# Patient Record
Sex: FEMALE | Race: WHITE | NOT HISPANIC OR LATINO | Employment: OTHER | URBAN - METROPOLITAN AREA
[De-identification: names, ages, dates, MRNs, and addresses within clinical notes are randomized per-mention and may not be internally consistent; named-entity substitution may affect disease eponyms.]

---

## 2020-11-10 ENCOUNTER — OFFICE VISIT (OUTPATIENT)
Dept: FAMILY MEDICINE | Facility: CLINIC | Age: 20
End: 2020-11-10
Payer: MEDICAID

## 2020-11-10 VITALS
WEIGHT: 240 LBS | HEIGHT: 65 IN | SYSTOLIC BLOOD PRESSURE: 120 MMHG | BODY MASS INDEX: 39.99 KG/M2 | OXYGEN SATURATION: 100 % | DIASTOLIC BLOOD PRESSURE: 79 MMHG | TEMPERATURE: 98.8 F | HEART RATE: 82 BPM

## 2020-11-10 DIAGNOSIS — F31.81 BIPOLAR 2 DISORDER, MAJOR DEPRESSIVE EPISODE (H): ICD-10-CM

## 2020-11-10 DIAGNOSIS — F41.1 GENERALIZED ANXIETY DISORDER: Primary | ICD-10-CM

## 2020-11-10 PROCEDURE — 99203 OFFICE O/P NEW LOW 30 MIN: CPT | Performed by: PHYSICIAN ASSISTANT

## 2020-11-10 PROCEDURE — 96127 BRIEF EMOTIONAL/BEHAV ASSMT: CPT | Performed by: PHYSICIAN ASSISTANT

## 2020-11-10 ASSESSMENT — ANXIETY QUESTIONNAIRES
2. NOT BEING ABLE TO STOP OR CONTROL WORRYING: NEARLY EVERY DAY
IF YOU CHECKED OFF ANY PROBLEMS ON THIS QUESTIONNAIRE, HOW DIFFICULT HAVE THESE PROBLEMS MADE IT FOR YOU TO DO YOUR WORK, TAKE CARE OF THINGS AT HOME, OR GET ALONG WITH OTHER PEOPLE: EXTREMELY DIFFICULT
5. BEING SO RESTLESS THAT IT IS HARD TO SIT STILL: NEARLY EVERY DAY
3. WORRYING TOO MUCH ABOUT DIFFERENT THINGS: NEARLY EVERY DAY
GAD7 TOTAL SCORE: 21
7. FEELING AFRAID AS IF SOMETHING AWFUL MIGHT HAPPEN: NEARLY EVERY DAY
6. BECOMING EASILY ANNOYED OR IRRITABLE: NEARLY EVERY DAY
1. FEELING NERVOUS, ANXIOUS, OR ON EDGE: NEARLY EVERY DAY

## 2020-11-10 ASSESSMENT — MIFFLIN-ST. JEOR: SCORE: 1857.63

## 2020-11-10 ASSESSMENT — PATIENT HEALTH QUESTIONNAIRE - PHQ9
5. POOR APPETITE OR OVEREATING: NEARLY EVERY DAY
SUM OF ALL RESPONSES TO PHQ QUESTIONS 1-9: 23

## 2020-11-10 NOTE — PATIENT INSTRUCTIONS
Patient Education     Understanding Generalized Anxiety Disorder (VALERY)    Anxiety can fill you with worry and fear. Sometimes anxiety is healthy. It alerts you to a potential threat and gets you to respond and take action. But for some people, anxiety gets so bad it causes problems in daily life. If you find yourself in a constant state of anxiety, you may have an anxiety disorder called generalized anxiety disorder (VALERY). Speak with your healthcare provider or mental health professional to learn more. He or she can help.   What is generalized anxiety disorder?  VALERY means that you are worrying constantly and can t control the worrying. Healthcare providers diagnose VALERY when your worrying happens on most days and for at least 6 months.  With VALERY, you might worry about money, your family and friends, work, or the world in general. You might not even be sure what you're anxious about. But whatever it is, you have an intense fear that the worst will happen. These feelings never really go away. In people age 65 and older, VALERY is one of the most commonly diagnosed anxiety disorders.  Many times it occurs with depression. This constant worry affects your quality of life and makes it hard to function. VALERY can cause physical symptoms, too.  What are common symptoms of generalized anxiety disorder?  People with VALERY often think they have a physical illness. The disorder can cause symptoms, such as:    Excessive worry that interferes with daily activities and lasts for at least 6 months    Muscle tension, especially in the neck and shoulders    Nausea and stomach problems    Frequent headaches    Feeling lightheaded    Restlessness, trouble sleeping    Feeling irritable and on edge all the time  How can generalized anxiety disorder be treated?  VALERY can be treated with medicine or therapy (also called counseling), or both. Medicine helps to reduce symptoms, so you can continue with your daily routine. Therapy helps you  understand the cause of your anxiety and learn how to manage it. Both forms of treatment help you deal with problems that anxiety causes in your life. This helps you to be healthier and happier.  Lissette last reviewed this educational content on 5/1/2017 2000-2020 [employer]. [address] [place] [address], [place] [address]. All rights reserved. This information is not intended as a substitute for professional medical care. Always follow your healthcare professional's instructions.

## 2020-11-10 NOTE — PROGRESS NOTES
"Subjective     Jailyn SHIRA Bartholomew is a 20 year old female who presents to clinic today for the following health issues:    HPI   Patient is here today to discuss anxiety concerns. She states she has increased anxiety and stress surrounding school and the pandemic. Has been on medication in the past, but is not currently taking any medication. 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 She feels it went well last year - was on the honor roll, but this year she is struggling quite a bit more. This stresses her out and she becomes very anxious. She has been seeing a counselor at school which she thinks is helpful. She constantly feels she is behind and puts a lot of pressure on herself. She is nervous about getting covid - social situations make her anxious. She wakes up in the morning with panic attack. Heart is racing, can't breathe, shaking. Has those symptoms throughout the day. No thoughts of self harm. No suicidal thoughts. In  2014 she was hospitalized and diagnosed with bipolar disorder- [facility].   Has nexplanon. Has some bleeding recently - wonders if she needs to have this replaced. Reports awful PMS.   PHQ 11/10/2020   PHQ-9 Total Score 23   Q9: Thoughts of better off dead/self-harm past 2 weeks Several days     VALERY-7 SCORE 11/10/2020   Total Score 21       Review of Systems   Constitutional, HEENT, cardiovascular, pulmonary, gi and gu systems are negative, except as otherwise noted.      Objective    /79 (BP Location: Right arm, Patient Position: Chair, Cuff Size: Adult Regular)   Pulse 82   Temp 98.8  F (37.1  C) (Oral)   Ht 1.648 m (5' 4.88\")   Wt 108.9 kg (240 lb)   LMP 10/23/2020   SpO2 100%   Breastfeeding No   BMI 40.08 kg/m    Body mass index is 40.08 kg/m .  Physical Exam   GENERAL: healthy, alert and no distress  NECK: no adenopathy, no asymmetry, masses, or scars and thyroid normal to palpation  RESP: lungs clear to auscultation - no rales, " "rhonchi or wheezes  CV: regular rate and rhythm, normal S1 S2, no S3 or S4, no murmur, click or rub, no peripheral edema and peripheral pulses strong  ABDOMEN: soft, nontender, no hepatosplenomegaly, no masses and bowel sounds normal  MS: no gross musculoskeletal defects noted, no edema        Assessment & Plan     Generalized anxiety disorder  Bipolar 2 disorder, major depressive episode (H)  Will restart sertraline - this is a medication she has been on in the past and she thinks it went well for her. Would also recommend her to see a therapist. Has concerns about financial aspect as there is currently a glitch in her insurance coverage. With bipolar history, would recommend she see psychiatrist, but patient is back and forth from school in [place] right now. If not responding well to sertraline - discussed this as the next step. She agrees with this plan. Patient reiterates she has no suicidal thoughts or thoughts of self harm. She has a good support system - boyfriend - who she feels comfortable talking about these things with if she were to ever have worsening thoughts. She has no additional questions today. Recheck GAD7 and PHQ9 in 6 weeks.   - sertraline (ZOLOFT) 50 MG tablet; Take 1 tablet (50 mg) by mouth daily  - MENTAL HEALTH REFERRAL  - Adult; Outpatient Treatment; Individual/Couples/Family/Group Therapy/Health Psychology; [facility]: [facility] [phone]; We will contact you to schedule the appointment or please call with any questions     BMI:   Estimated body mass index is 40.08 kg/m  as calculated from the following:    Height as of this encounter: 1.648 m (5' 4.88\").    Weight as of this encounter: 108.9 kg (240 lb).   Weight management plan: Discussed healthy diet and exercise guidelines       Depression Screening Follow Up    PHQ 11/10/2020   PHQ-9 Total Score 23   Q9: Thoughts of better off dead/self-harm past 2 weeks Several days            Follow Up      Follow Up Actions " Taken  Crisis resource information provided in the After Visit Summary  Mental Health Referral placed    Discussed the following ways the patient can remain in a safe environment:  be around others        Return in about 6 weeks (around 12/22/2020).    Charlette Mckeon PA-C  [facility]

## 2020-11-11 ASSESSMENT — ANXIETY QUESTIONNAIRES: GAD7 TOTAL SCORE: 21

## 2021-01-07 ENCOUNTER — VIRTUAL VISIT (OUTPATIENT)
Dept: FAMILY MEDICINE | Facility: CLINIC | Age: 21
End: 2021-01-07
Payer: COMMERCIAL

## 2021-01-07 DIAGNOSIS — F41.1 GENERALIZED ANXIETY DISORDER: Primary | ICD-10-CM

## 2021-01-07 PROCEDURE — 99213 OFFICE O/P EST LOW 20 MIN: CPT | Mod: 95 | Performed by: PHYSICIAN ASSISTANT

## 2021-01-07 ASSESSMENT — ANXIETY QUESTIONNAIRES
IF YOU CHECKED OFF ANY PROBLEMS ON THIS QUESTIONNAIRE, HOW DIFFICULT HAVE THESE PROBLEMS MADE IT FOR YOU TO DO YOUR WORK, TAKE CARE OF THINGS AT HOME, OR GET ALONG WITH OTHER PEOPLE: VERY DIFFICULT
7. FEELING AFRAID AS IF SOMETHING AWFUL MIGHT HAPPEN: NEARLY EVERY DAY
5. BEING SO RESTLESS THAT IT IS HARD TO SIT STILL: NEARLY EVERY DAY
GAD7 TOTAL SCORE: 21
3. WORRYING TOO MUCH ABOUT DIFFERENT THINGS: NEARLY EVERY DAY
6. BECOMING EASILY ANNOYED OR IRRITABLE: NEARLY EVERY DAY
2. NOT BEING ABLE TO STOP OR CONTROL WORRYING: NEARLY EVERY DAY
1. FEELING NERVOUS, ANXIOUS, OR ON EDGE: NEARLY EVERY DAY

## 2021-01-07 ASSESSMENT — PATIENT HEALTH QUESTIONNAIRE - PHQ9
5. POOR APPETITE OR OVEREATING: NEARLY EVERY DAY
SUM OF ALL RESPONSES TO PHQ QUESTIONS 1-9: 18

## 2021-01-07 NOTE — PROGRESS NOTES
Jailyn Bartholomew is a 20 year old female who is being evaluated via a billable telephone visit.      What phone number would you like to be contacted at? [phone]  How would you like to obtain your AVS? Mail a copy [phone] [id number]   Assessment & Plan     Generalized anxiety disorder  Will increase zoloft dose to 100 mg. She has had some benefit and suggest we continue to work to find an optimal dose for her. Otherwise she is doing well. She wonders about an emotional support animal - would recommend she speak with psychiatry about this as we are unable to do this in the family practice setting.   - sertraline (ZOLOFT) 50 MG tablet; Take 2 tablets (100 mg) by mouth daily  - MENTAL HEALTH REFERRAL  - Adult; Assessments and Testing; MH/[facility] Assessment [facility] - Assess & Treat; Mental Health Evaluation - determine appropriate level of care and admit to program; [facility]: [facility] [phone]; We will contact you to sche...      Depression Screening Follow Up    PHQ 1/7/2021   PHQ-9 Total Score 18   Q9: Thoughts of better off dead/self-harm past 2 weeks Nearly every day     Return in about 3 months (around 4/7/2021).    Charlette Mckeon PA-C  [facility]     Jailyn Bartholomew is a 20 year old who presents to clinic today for the following health issues     HPI       Anxiety Follow-Up    How are you doing with your anxiety since your last visit? Improved     Are you having other symptoms that might be associated with anxiety? Yes:  Anxiety attacks but has gotten better    Have you had a significant life event? No     Are you feeling depressed? Yes:  Somtimes    Do you have any concerns with your use of alcohol or other drugs? No    Sought out academic accomodations for her anxiety. She saw a therapist through school. Sees the therapist once a week.     On track to graduate in May.     Started sertraline - had some menstrual concerns during the first 2  weeks. This improved greatly. Feels her energy has increased.     Wondering about an emotional support animal.     Social History     Tobacco Use     Smoking status: Never Smoker     Smokeless tobacco: Never Used   Substance Use Topics     Alcohol use: Yes     Drug use: Never     VALERY-7 SCORE 11/10/2020 1/7/2021   Total Score 21 21     PHQ 11/10/2020 1/7/2021   PHQ-9 Total Score 23 18   Q9: Thoughts of better off dead/self-harm past 2 weeks Several days Nearly every day           How many servings of fruits and vegetables do you eat daily?  0-1 1-2    On average, how many sweetened beverages do you drink each day (Examples: soda, juice, sweet tea, etc.  Do NOT count diet or artificially sweetened beverages)?   1 cup of coffee every 3 days    How many days per week do you exercise enough to make your heart beat faster? 3 or less    How many minutes a day do you exercise enough to make your heart beat faster? 9 or less  How many days per week do you miss taking your medication? 3 days     What makes it hard for you to take your medications?  traveled        Review of Systems   Constitutional, HEENT, cardiovascular, pulmonary, gi and gu systems are negative, except as otherwise noted.      Objective           Vitals:  No vitals were obtained today due to virtual visit.    Physical Exam   healthy, alert and no distress  PSYCH: Alert and oriented times 3; coherent speech, normal   rate and volume, able to articulate logical thoughts, able   to abstract reason, no tangential thoughts, no hallucinations   or delusions  Her affect is normal  RESP: No cough, no audible wheezing, able to talk in full sentences  Remainder of exam unable to be completed due to telephone visits                Phone call duration: 12 minutes

## 2021-01-08 ASSESSMENT — ANXIETY QUESTIONNAIRES: GAD7 TOTAL SCORE: 21

## 2021-03-15 ENCOUNTER — TRANSFERRED RECORDS (OUTPATIENT)
Dept: MULTI SPECIALTY CLINIC | Facility: CLINIC | Age: 21
End: 2021-03-15

## 2021-03-15 LAB
C TRACH DNA SPEC QL PROBE+SIG AMP: NEGATIVE
N GONORRHOEA DNA SPEC QL PROBE+SIG AMP: NEGATIVE
SPECIMEN DESCRIP: NORMAL
SPECIMEN DESCRIPTION: NORMAL

## 2021-03-18 ENCOUNTER — TELEPHONE (OUTPATIENT)
Dept: FAMILY MEDICINE | Facility: CLINIC | Age: 21
End: 2021-03-18

## 2021-03-18 NOTE — TELEPHONE ENCOUNTER
Please abstract the following data from this visit with this patient into the appropriate field in Epic:    Tests that can be patient reported without a hard copy:      Other Tests found in the patient's chart through Chart Review/Care Everywhere:    Chlamydia testing done on this date: 3/15/21 [facility]    Note to Abstraction: If this section is blank, no results were found via Chart Review/Care Everywhere.

## 2021-05-17 ENCOUNTER — VIRTUAL VISIT (OUTPATIENT)
Dept: FAMILY MEDICINE | Facility: CLINIC | Age: 21
End: 2021-05-17
Payer: COMMERCIAL

## 2021-05-17 DIAGNOSIS — F41.1 GENERALIZED ANXIETY DISORDER: Primary | ICD-10-CM

## 2021-05-17 DIAGNOSIS — F31.81 BIPOLAR 2 DISORDER, MAJOR DEPRESSIVE EPISODE (H): ICD-10-CM

## 2021-05-17 PROCEDURE — 99214 OFFICE O/P EST MOD 30 MIN: CPT | Mod: 95 | Performed by: PHYSICIAN ASSISTANT

## 2021-05-17 PROCEDURE — 96127 BRIEF EMOTIONAL/BEHAV ASSMT: CPT | Performed by: PHYSICIAN ASSISTANT

## 2021-05-17 ASSESSMENT — ANXIETY QUESTIONNAIRES
5. BEING SO RESTLESS THAT IT IS HARD TO SIT STILL: NOT AT ALL
IF YOU CHECKED OFF ANY PROBLEMS ON THIS QUESTIONNAIRE, HOW DIFFICULT HAVE THESE PROBLEMS MADE IT FOR YOU TO DO YOUR WORK, TAKE CARE OF THINGS AT HOME, OR GET ALONG WITH OTHER PEOPLE: SOMEWHAT DIFFICULT
2. NOT BEING ABLE TO STOP OR CONTROL WORRYING: MORE THAN HALF THE DAYS
6. BECOMING EASILY ANNOYED OR IRRITABLE: MORE THAN HALF THE DAYS
GAD7 TOTAL SCORE: 9
3. WORRYING TOO MUCH ABOUT DIFFERENT THINGS: SEVERAL DAYS
1. FEELING NERVOUS, ANXIOUS, OR ON EDGE: MORE THAN HALF THE DAYS
7. FEELING AFRAID AS IF SOMETHING AWFUL MIGHT HAPPEN: SEVERAL DAYS

## 2021-05-17 ASSESSMENT — PATIENT HEALTH QUESTIONNAIRE - PHQ9
5. POOR APPETITE OR OVEREATING: SEVERAL DAYS
SUM OF ALL RESPONSES TO PHQ QUESTIONS 1-9: 7

## 2021-05-17 NOTE — PROGRESS NOTES
"Jailyn is a 20 year old who is being evaluated via a billable telephone visit.      What phone number would you like to be contacted at? [phone]  How would you like to obtain your AVS? Mail a copy DECLINED    Assessment & Plan     Generalized anxiety disorder  Bipolar 2 disorder, major depressive episode (H)  Greatly improved from previous visits. Feels comfortable coping with stressful events in her life. No longer having panic attacks. Will stay at current dose. Recheck in 6 months.   - sertraline (ZOLOFT) 50 MG tablet; Take 2 tablets (100 mg) by mouth daily             BMI:   Estimated body mass index is 40.08 kg/m  as calculated from the following:    Height as of 11/10/20: 1.648 m (5' 4.88\").    Weight as of 11/10/20: 108.9 kg (240 lb).           Return in about 6 months (around 11/17/2021).    [facility]    Subjective   Jailyn is a 20 year old who presents for the following health issues     HPI      Anxiety Follow-Up    How are you doing with your anxiety since your last visit? Improved     Are you having other symptoms that might be associated with anxiety? Yes:  Irritable    Have you had a significant life event? OTHER: Graduating college, job searching, possible new move     Are you feeling depressed? No    Do you have any concerns with your use of alcohol or other drugs? No    Significantly improved. Normal stressful events in her life, but acknowledges these are stressful and feels better about coping with things. Used to have anxiety attacks right away in the morning. Was having difficulty sleeping - this has improved. Still has some irritability.   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     Social History     Tobacco Use     Smoking status: Never Smoker     Smokeless tobacco: Never Used   Substance Use Topics     Alcohol use: Yes     Drug use: Never     VALERY-7 SCORE 11/10/2020 1/7/2021 5/17/2021   Total Score 21 21 9     PHQ 11/10/2020 1/7/2021 " 5/17/2021   PHQ-9 Total Score 23 18 7   Q9: Thoughts of better off dead/self-harm past 2 weeks Several days Nearly every day Not at all     VALERY-7  5/17/2021   1. Feeling nervous, anxious, or on edge 2   2. Not being able to stop or control worrying 2   3. Worrying too much about different things 1   4. Trouble relaxing 1   5. Being so restless that it is hard to sit still 0   6. Becoming easily annoyed or irritable 2   7. Feeling afraid, as if something awful might happen 1   VALERY-7 Total Score 9   If you checked any problems, how difficult have they made it for you to do your work, take care of things at home, or get along with other people? Somewhat difficult     Over the last two weeks, how often have you been bothered by any the following symptoms?  Please use the following scale;  0 = Not at all  1 = Several days but less than half  2 = More than half of the days  3 = Nearly every day    1) Little interest or pleasure in doing things [  1  ]  2) Feeling down, depressed, or hopeless.[  0  ]  3) Trouble falling or staying asleep, or sleeping too much [  1  ]  4) Feeling tired or having little energy.[  1  ]  5) Poor appetite or overeating [  3  ]  6) Feeling bad about yourself - or that you are a failure or have let yourself or your family down [   1 ]  7) Trouble concentrating on things, such as reading the newspaper or watching television [  0  ]  8) Moving or speaking so slowly that other people could have noticed. Or the opposite-being fidgety or restless that you have been moving around a lot more than usual [  0  ]  9) Thoughts that you would be better off dead, or of hurting yourself in some way [  0  ]    Finally, how difficult have these problems made it for you to do your work, take care of things at home, or get along with other people?   somewhat difficult            How many servings of fruits and vegetables do you eat daily?  2-3    On average, how many sweetened beverages do you drink each day  (Examples: soda, juice, sweet tea, etc.  Do NOT count diet or artificially sweetened beverages)?   1    How many days per week do you exercise enough to make your heart beat faster? 3 or less    How many minutes a day do you exercise enough to make your heart beat faster? 20 - 29    How many days per week do you miss taking your medication? 0        Review of Systems   Constitutional, HEENT, cardiovascular, pulmonary, gi and gu systems are negative, except as otherwise noted.      Objective           Vitals:  No vitals were obtained today due to virtual visit.    Physical Exam   healthy, alert and no distress  PSYCH: Alert and oriented times 3; coherent speech, normal   rate and volume, able to articulate logical thoughts, able   to abstract reason, no tangential thoughts, no hallucinations   or delusions  Her affect is normal  RESP: No cough, no audible wheezing, able to talk in full sentences  Remainder of exam unable to be completed due to telephone visits                Phone call duration: 18 minutes

## 2021-05-18 ASSESSMENT — ANXIETY QUESTIONNAIRES: GAD7 TOTAL SCORE: 9

## 2022-03-08 ENCOUNTER — VIRTUAL VISIT (OUTPATIENT)
Dept: FAMILY MEDICINE | Facility: CLINIC | Age: 22
End: 2022-03-08
Payer: COMMERCIAL

## 2022-03-08 DIAGNOSIS — F41.1 GENERALIZED ANXIETY DISORDER: ICD-10-CM

## 2022-03-08 PROCEDURE — 99213 OFFICE O/P EST LOW 20 MIN: CPT | Mod: TEL | Performed by: PHYSICIAN ASSISTANT

## 2022-03-08 ASSESSMENT — ANXIETY QUESTIONNAIRES
2. NOT BEING ABLE TO STOP OR CONTROL WORRYING: MORE THAN HALF THE DAYS
6. BECOMING EASILY ANNOYED OR IRRITABLE: MORE THAN HALF THE DAYS
1. FEELING NERVOUS, ANXIOUS, OR ON EDGE: MORE THAN HALF THE DAYS
IF YOU CHECKED OFF ANY PROBLEMS ON THIS QUESTIONNAIRE, HOW DIFFICULT HAVE THESE PROBLEMS MADE IT FOR YOU TO DO YOUR WORK, TAKE CARE OF THINGS AT HOME, OR GET ALONG WITH OTHER PEOPLE: EXTREMELY DIFFICULT
GAD7 TOTAL SCORE: 13
7. FEELING AFRAID AS IF SOMETHING AWFUL MIGHT HAPPEN: MORE THAN HALF THE DAYS
5. BEING SO RESTLESS THAT IT IS HARD TO SIT STILL: NOT AT ALL
3. WORRYING TOO MUCH ABOUT DIFFERENT THINGS: MORE THAN HALF THE DAYS

## 2022-03-08 ASSESSMENT — PATIENT HEALTH QUESTIONNAIRE - PHQ9
SUM OF ALL RESPONSES TO PHQ QUESTIONS 1-9: 8
5. POOR APPETITE OR OVEREATING: NEARLY EVERY DAY
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
## 2022-03-09 ASSESSMENT — ANXIETY QUESTIONNAIRES: GAD7 TOTAL SCORE: 13

## 2022-05-12 ENCOUNTER — TELEPHONE (OUTPATIENT)
Dept: FAMILY MEDICINE | Facility: CLINIC | Age: 22
End: 2022-05-12
Payer: COMMERCIAL

## 2022-05-12 NOTE — LETTER
May 13, 2022      Jailyn Bartholomew  [address] [address] [address]      Your healthcare team cares about your health. To provide you with the best care,   we have reviewed your chart and based on our findings, we see that you are due to:     - CERVICAL CANCER SCREENING: Schedule a Cervical Cancer Screening, with Pap and wellness exam.     If you have already completed these items, please contact the clinic via phone or   Mychart so your care team can review and update your records. Thank you for   choosing [facility] for your healthcare needs. For any questions,   concerns, or to schedule an appointment please contact the clinic.       Healthy Regards,      Your [facility] Care Team

## 2022-05-12 NOTE — LETTER
May 27, 2022      Jailyn Bartholomew  [address] [address] [address]      Your healthcare team cares about your health. To provide you with the best care,   we have reviewed your chart and based on our findings, we see that you are due to:     - CERVICAL CANCER SCREENING: Schedule a Cervical Cancer Screening, with Pap and wellness exam.     If you have already completed these items, please contact the clinic via phone or   Mychart so your care team can review and update your records. Thank you for   choosing [facility] for your healthcare needs. For any questions,   concerns, or to schedule an appointment please contact the clinic.       Healthy Regards,      Your [facility] Care Team

## 2022-05-13 NOTE — TELEPHONE ENCOUNTER
Patient Quality Outreach    Patient is due for the following:   Cervical Cancer Screening - PAP Needed  Physical  - Past due  Immunizations  -  Covid    NEXT STEPS:   Schedule a yearly physical    Type of outreach:    Sent letter.      Questions for provider review:    None     Rosario Rosas

## 2022-05-27 NOTE — TELEPHONE ENCOUNTER
Patient Quality Outreach    Patient is due for the following:   Cervical Cancer Screening - PAP Needed  Physical  - Due after past due    NEXT STEPS:   Schedule a yearly physical    Type of outreach:    Sent letter.    Next Steps:  Reach out within 90 days via Phone.    Max number of attempts reached: Yes. Will try again in 90 days if patient still on fail list.    Questions for provider review:    None     Rosario Rosas

## 2022-09-06 DIAGNOSIS — F41.1 GENERALIZED ANXIETY DISORDER: ICD-10-CM

## 2022-09-06 NOTE — TELEPHONE ENCOUNTER
Reason for Call:  Medication or medication refill:    Do you use a [facility]?  Name of the pharmacy and phone number for the current request:  [facility] in [place]    Name of the medication requested: sertraline (ZOLOFT) 50 MG tablet    Can we leave a detailed message on this number? YES    Phone number patient can be reached at: Cell number on file:    Telephone Information:   Mobile [phone]     Best Time: anytime    Call taken on 9/6/2022 at 9:10 AM by Audrey Gale

## 2022-09-07 NOTE — TELEPHONE ENCOUNTER
"Requested Prescriptions   Pending Prescriptions Disp Refills     sertraline (ZOLOFT) 50 MG tablet 180 tablet 1     Sig: Take 2 tablets (100 mg) by mouth daily       SSRIs Protocol Passed - 9/6/2022  9:10 AM        Passed - Recent (12 mo) or future (30 days) visit within the authorizing provider's specialty     Patient has had an office visit with the authorizing provider or a provider within the authorizing providers department within the previous 12 mos or has a future within next 30 days. See \"Patient Info\" tab in inbasket, or \"Choose Columns\" in Meds & Orders section of the refill encounter.              Passed - Medication is active on med list        Passed - Patient is age 18 or older        Passed - No active pregnancy on record        Passed - No positive pregnancy test in last 12 months           Sasha Rodriguez RN  [facility]     "

## 2023-09-15 DIAGNOSIS — F41.1 GENERALIZED ANXIETY DISORDER: ICD-10-CM

## 2023-09-15 NOTE — TELEPHONE ENCOUNTER
Patient returned call and advised.      Phone visit scheduled with Charlette Mckeon on Tuesday, October 10th at 12 noon.    Audrey Gale,

## 2023-09-15 NOTE — TELEPHONE ENCOUNTER
Left message informing pt that she is due for an office visit.  Ana Campuzano MA on 9/15/2023 at 10:14 AM

## 2023-09-26 ENCOUNTER — TRANSFERRED RECORDS (OUTPATIENT)
Dept: MULTI SPECIALTY CLINIC | Facility: CLINIC | Age: 23
End: 2023-09-26

## 2023-09-26 LAB
ALT SERPL-CCNC: 9 IU/L (ref 10–35)
AST SERPL-CCNC: 17 IU/L (ref 10–35)
C TRACH DNA SPEC QL PROBE+SIG AMP: NEGATIVE
CHOLESTEROL (EXTERNAL): 169 MG/DL (ref 100–199)
CREATININE (EXTERNAL): 0.77 MG/DL (ref 0.5–0.9)
GFR ESTIMATED (EXTERNAL): >90 ML/MIN/1.73M2
GLUCOSE (EXTERNAL): 92 MG/DL (ref 70–99)
HBA1C MFR BLD: 5 %
HDLC SERPL-MCNC: 44 MG/DL
HEP C HIM: NORMAL
HIV 1&2 EXT: NORMAL
LDL CHOLESTEROL CALCULATED (EXTERNAL): 112 MG/DL
N GONORRHOEA DNA SPEC QL PROBE+SIG AMP: NEGATIVE
NON HDL CHOLESTEROL (EXTERNAL): 125 MG/DL
PAP-ABSTRACT: ABNORMAL
POTASSIUM (EXTERNAL): 3.6 MMOL/L (ref 3.5–5.1)
SPECIMEN DESCRIP: NORMAL
SPECIMEN DESCRIPTION: NORMAL
TRIGLYCERIDES (EXTERNAL): 67 MG/DL
TSH SERPL-ACNC: 5.46 UIU/ML (ref 0.27–4.2)

## 2023-09-29 ENCOUNTER — MYC MEDICAL ADVICE (OUTPATIENT)
Dept: FAMILY MEDICINE | Facility: CLINIC | Age: 23
End: 2023-09-29
Payer: COMMERCIAL

## 2023-10-10 ENCOUNTER — VIRTUAL VISIT (OUTPATIENT)
Dept: FAMILY MEDICINE | Facility: CLINIC | Age: 23
End: 2023-10-10
Payer: COMMERCIAL

## 2023-10-10 DIAGNOSIS — R79.89 ELEVATED TSH: Primary | ICD-10-CM

## 2023-10-10 DIAGNOSIS — F41.1 GENERALIZED ANXIETY DISORDER: ICD-10-CM

## 2023-10-10 PROCEDURE — 99213 OFFICE O/P EST LOW 20 MIN: CPT | Mod: 93 | Performed by: PHYSICIAN ASSISTANT

## 2023-10-10 PROCEDURE — 96127 BRIEF EMOTIONAL/BEHAV ASSMT: CPT | Performed by: PHYSICIAN ASSISTANT

## 2023-10-10 ASSESSMENT — ANXIETY QUESTIONNAIRES
1. FEELING NERVOUS, ANXIOUS, OR ON EDGE: NOT AT ALL
7. FEELING AFRAID AS IF SOMETHING AWFUL MIGHT HAPPEN: NOT AT ALL
GAD7 TOTAL SCORE: 0
GAD7 TOTAL SCORE: 0
6. BECOMING EASILY ANNOYED OR IRRITABLE: NOT AT ALL
2. NOT BEING ABLE TO STOP OR CONTROL WORRYING: NOT AT ALL
5. BEING SO RESTLESS THAT IT IS HARD TO SIT STILL: NOT AT ALL
IF YOU CHECKED OFF ANY PROBLEMS ON THIS QUESTIONNAIRE, HOW DIFFICULT HAVE THESE PROBLEMS MADE IT FOR YOU TO DO YOUR WORK, TAKE CARE OF THINGS AT HOME, OR GET ALONG WITH OTHER PEOPLE: NOT DIFFICULT AT ALL
3. WORRYING TOO MUCH ABOUT DIFFERENT THINGS: NOT AT ALL

## 2023-10-10 ASSESSMENT — PATIENT HEALTH QUESTIONNAIRE - PHQ9
5. POOR APPETITE OR OVEREATING: NOT AT ALL
SUM OF ALL RESPONSES TO PHQ QUESTIONS 1-9: 1

## 2023-10-10 NOTE — PROGRESS NOTES
Jailyn is a 23 year old who is being evaluated via a billable telephone visit.      What phone number would you like to be contacted at? [phone]  How would you like to obtain your AVS? Crescencio    [place] (provider location):  On-site    Assessment & Plan     Generalized anxiety disorder  PHQ9 is 1, GAD7 is 0. Will continue current medication.  - sertraline (ZOLOFT) 50 MG tablet; Take 2 tablets (100 mg) by mouth daily    Elevated TSH  Had elevated TSH. Will complete additional labs to determine if thyroid medication is needed.   - TSH; Future  - T4, free; Future  - T3, Free; Future  - Thyroid peroxidase antibody; Future                 [facility] [place] Glaser is a 23 year old, presenting for the following health issues:  Recheck Medication        10/10/2023    11:45 AM   Additional Questions   Roomed by cam   Accompanied by none         10/10/2023    11:45 AM   Patient Reported Additional Medications   Patient reports taking the following new medications none       HPI       Thyroid -   TSH was elevated in bariatric surgery pre op labs. Paternal aunt has hypothyroidism - takes medication.       Depression and Anxiety Follow-Up  How are you doing with your depression since your last visit? Improved   How are you doing with your anxiety since your last visit?  Improved   Are you having other symptoms that might be associated with depression or anxiety? No  Have you had a significant life event? Health Concerns   Do you have any concerns with your use of alcohol or other drugs? No    Social History     Tobacco Use    Smoking status: Never    Smokeless tobacco: Never   Vaping Use    Vaping Use: Never used   Substance Use Topics    Alcohol use: Yes    Drug use: Never         5/17/2021     2:18 PM 3/8/2022     1:56 PM 10/10/2023    11:46 AM   PHQ   PHQ-9 Total Score 7 8 1   Q9: Thoughts of better off dead/self-harm past 2 weeks Not at all Not at all Not  at all         5/17/2021     1:44 PM 3/8/2022     1:56 PM 10/10/2023    11:46 AM   VALERY-7 SCORE   Total Score 9 13 0         10/10/2023    11:46 AM   Last PHQ-9   1.  Little interest or pleasure in doing things 0   2.  Feeling down, depressed, or hopeless 0   3.  Trouble falling or staying asleep, or sleeping too much 1   4.  Feeling tired or having little energy 0   5.  Poor appetite or overeating 0   6.  Feeling bad about yourself 0   7.  Trouble concentrating 0   8.  Moving slowly or restless 0   Q9: Thoughts of better off dead/self-harm past 2 weeks 0   PHQ-9 Total Score 1   Difficulty at work, home, or with people Not difficult at all         10/10/2023    11:46 AM   VALERY-7    1. Feeling nervous, anxious, or on edge 0   2. Not being able to stop or control worrying 0   3. Worrying too much about different things 0   4. Trouble relaxing 0   5. Being so restless that it is hard to sit still 0   6. Becoming easily annoyed or irritable 0   7. Feeling afraid, as if something awful might happen 0   VALERY-7 Total Score 0   If you checked any problems, how difficult have they made it for you to do your work, take care of things at home, or get along with other people? Not difficult at all       Suicide Assessment Five-step Evaluation and Treatment (SAFE-T)    How many servings of fruits and vegetables do you eat daily?  2-3  On average, how many sweetened beverages do you drink each day (Examples: soda, juice, sweet tea, etc.  Do NOT count diet or artificially sweetened beverages)?   0  How many days per week do you exercise enough to make your heart beat faster? 3 or less  How many minutes a day do you exercise enough to make your heart beat faster? 30 - 60  How many days per week do you miss taking your medication? 0        Review of Systems   Constitutional, HEENT, cardiovascular, pulmonary, gi and gu systems are negative, except as otherwise noted.      Objective           Vitals:  No vitals were obtained today due to  virtual visit.    Physical Exam   healthy, alert, and no distress  PSYCH: Alert and oriented times 3; coherent speech, normal   rate and volume, able to articulate logical thoughts, able   to abstract reason, no tangential thoughts, no hallucinations   or delusions  Her affect is normal  RESP: No cough, no audible wheezing, able to talk in full sentences  Remainder of exam unable to be completed due to telephone visits                Phone call duration: 12 minutes

## 2023-10-18 ENCOUNTER — LAB (OUTPATIENT)
Dept: LAB | Facility: CLINIC | Age: 23
End: 2023-10-18
Payer: COMMERCIAL

## 2023-10-18 DIAGNOSIS — R79.89 ELEVATED TSH: ICD-10-CM

## 2023-10-18 LAB
T4 FREE SERPL-MCNC: 1.04 NG/DL (ref 0.9–1.7)
TSH SERPL DL<=0.005 MIU/L-ACNC: 2.67 UIU/ML (ref 0.3–4.2)

## 2023-10-18 PROCEDURE — 84439 ASSAY OF FREE THYROXINE: CPT

## 2023-10-18 PROCEDURE — 36415 COLL VENOUS BLD VENIPUNCTURE: CPT

## 2023-10-18 PROCEDURE — 84443 ASSAY THYROID STIM HORMONE: CPT

## 2023-10-18 PROCEDURE — 84481 FREE ASSAY (FT-3): CPT

## 2023-10-18 PROCEDURE — 86376 MICROSOMAL ANTIBODY EACH: CPT

## 2023-10-19 LAB
T3FREE SERPL-MCNC: 2.9 PG/ML (ref 2–4.4)
THYROPEROXIDASE AB SERPL-ACNC: <10 IU/ML

## 2024-01-23 ENCOUNTER — TELEPHONE (OUTPATIENT)
Dept: FAMILY MEDICINE | Facility: CLINIC | Age: 24
End: 2024-01-23
Payer: COMMERCIAL

## 2024-01-25 ENCOUNTER — OFFICE VISIT (OUTPATIENT)
Dept: FAMILY MEDICINE | Facility: CLINIC | Age: 24
End: 2024-01-25
Payer: COMMERCIAL

## 2024-01-25 VITALS
WEIGHT: 287 LBS | SYSTOLIC BLOOD PRESSURE: 123 MMHG | OXYGEN SATURATION: 99 % | TEMPERATURE: 98.2 F | BODY MASS INDEX: 47.82 KG/M2 | DIASTOLIC BLOOD PRESSURE: 85 MMHG | HEIGHT: 65 IN | HEART RATE: 91 BPM

## 2024-01-25 DIAGNOSIS — F41.1 GENERALIZED ANXIETY DISORDER: ICD-10-CM

## 2024-01-25 DIAGNOSIS — Z01.818 PREOP GENERAL PHYSICAL EXAM: Primary | ICD-10-CM

## 2024-01-25 DIAGNOSIS — E66.2 CLASS 3 OBESITY WITH ALVEOLAR HYPOVENTILATION WITHOUT SERIOUS COMORBIDITY WITH BODY MASS INDEX (BMI) OF 45.0 TO 49.9 IN ADULT (H): ICD-10-CM

## 2024-01-25 DIAGNOSIS — E66.813 CLASS 3 OBESITY WITH ALVEOLAR HYPOVENTILATION WITHOUT SERIOUS COMORBIDITY WITH BODY MASS INDEX (BMI) OF 45.0 TO 49.9 IN ADULT (H): ICD-10-CM

## 2024-01-25 PROCEDURE — 99214 OFFICE O/P EST MOD 30 MIN: CPT | Performed by: PHYSICIAN ASSISTANT

## 2024-01-25 ASSESSMENT — PATIENT HEALTH QUESTIONNAIRE - PHQ9
10. IF YOU CHECKED OFF ANY PROBLEMS, HOW DIFFICULT HAVE THESE PROBLEMS MADE IT FOR YOU TO DO YOUR WORK, TAKE CARE OF THINGS AT HOME, OR GET ALONG WITH OTHER PEOPLE: NOT DIFFICULT AT ALL
SUM OF ALL RESPONSES TO PHQ QUESTIONS 1-9: 0
SUM OF ALL RESPONSES TO PHQ QUESTIONS 1-9: 0

## 2024-01-25 ASSESSMENT — ANXIETY QUESTIONNAIRES
7. FEELING AFRAID AS IF SOMETHING AWFUL MIGHT HAPPEN: NOT AT ALL
2. NOT BEING ABLE TO STOP OR CONTROL WORRYING: NOT AT ALL
7. FEELING AFRAID AS IF SOMETHING AWFUL MIGHT HAPPEN: NOT AT ALL
6. BECOMING EASILY ANNOYED OR IRRITABLE: NOT AT ALL
GAD7 TOTAL SCORE: 0
4. TROUBLE RELAXING: NOT AT ALL
GAD7 TOTAL SCORE: 0
8. IF YOU CHECKED OFF ANY PROBLEMS, HOW DIFFICULT HAVE THESE MADE IT FOR YOU TO DO YOUR WORK, TAKE CARE OF THINGS AT HOME, OR GET ALONG WITH OTHER PEOPLE?: NOT DIFFICULT AT ALL
5. BEING SO RESTLESS THAT IT IS HARD TO SIT STILL: NOT AT ALL
GAD7 TOTAL SCORE: 0
1. FEELING NERVOUS, ANXIOUS, OR ON EDGE: NOT AT ALL
3. WORRYING TOO MUCH ABOUT DIFFERENT THINGS: NOT AT ALL
IF YOU CHECKED OFF ANY PROBLEMS ON THIS QUESTIONNAIRE, HOW DIFFICULT HAVE THESE PROBLEMS MADE IT FOR YOU TO DO YOUR WORK, TAKE CARE OF THINGS AT HOME, OR GET ALONG WITH OTHER PEOPLE: NOT DIFFICULT AT ALL

## 2024-01-25 NOTE — PATIENT INSTRUCTIONS
Preparing for Your Surgery  Getting started  A nurse will call you to review your health history and instructions. They will give you an arrival time based on your scheduled surgery time. Please be ready to share:  Your doctor's clinic name and phone number  Your medical, surgical, and anesthesia history  A list of allergies and sensitivities  A list of medicines, including herbal treatments and over-the-counter drugs  Whether the patient has a legal guardian (ask how to send us the papers in advance)  Please tell us if you're pregnant--or if there's any chance you might be pregnant. Some surgeries may injure a fetus (unborn baby), so they require a pregnancy test. Surgeries that are safe for a fetus don't always need a test, and you can choose whether to have one.   If you have a child who's having surgery, please ask for a copy of Preparing for Your Child's Surgery.    Preparing for surgery  Within 10 to 30 days of surgery: Have a pre-op exam (sometimes called an H&P, or History and Physical). This can be done at a clinic or pre-operative center.  If you're having a , you may not need this exam. Talk to your care team.  At your pre-op exam, talk to your care team about all medicines you take. If you need to stop any medicines before surgery, ask when to start taking them again.  We do this for your safety. Many medicines can make you bleed too much during surgery. Some change how well surgery (anesthesia) drugs work.  Call your insurance company to let them know you're having surgery. (If you don't have insurance, call [phone].)  Call your clinic if there's any change in your health. This includes signs of a cold or flu (sore throat, runny nose, cough, rash, fever). It also includes a scrape or scratch near the surgery site.  If you have questions on the day of surgery, call your hospital or surgery center.  Eating and drinking guidelines  For your safety: Unless your surgeon tells you otherwise,  follow the guidelines below.  Eat and drink as usual until 8 hours before you arrive for surgery. After that, no food or milk.  Drink clear liquids until 2 hours before you arrive. These are liquids you can see through, like water, Gatorade, and Propel Water. They also include plain black coffee and tea (no cream or milk), candy, and breath mints. You can spit out gum when you arrive.  If you drink alcohol: Stop drinking it the night before surgery.  If your care team tells you to take medicine on the morning of surgery, it's okay to take it with a sip of water.  Preventing infection  Shower or bathe the night before and morning of your surgery. Follow the instructions your clinic gave you. (If no instructions, use regular soap.)  Don't shave or clip hair near your surgery site. We'll remove the hair if needed.  Don't smoke or vape the morning of surgery. You may chew nicotine gum up to 2 hours before surgery. A nicotine patch is okay.  Note: Some surgeries require you to completely quit smoking and nicotine. Check with your surgeon.  Your care team will make every effort to keep you safe from infection. We will:  Clean our hands often with soap and water (or an alcohol-based hand rub).  Clean the skin at your surgery site with a special soap that kills germs.  Give you a special gown to keep you warm. (Cold raises the risk of infection.)  Wear special hair covers, masks, gowns and gloves during surgery.  Give antibiotic medicine, if prescribed. Not all surgeries need antibiotics.  What to bring on the day of surgery  Photo ID and insurance card  Copy of your health care directive, if you have one  Glasses and hearing aids (bring cases)  You can't wear contacts during surgery  Inhaler and eye drops, if you use them (tell us about these when you arrive)  CPAP machine or breathing device, if you use them  A few personal items, if spending the night  If you have . . .  A pacemaker, ICD (cardiac defibrillator) or other  implant: Bring the ID card.  An implanted stimulator: Bring the remote control.  A legal guardian: Bring a copy of the certified (court-stamped) guardianship papers.  Please remove any jewelry, including body piercings. Leave jewelry and other valuables at home.  If you're going home the day of surgery  You must have a responsible adult drive you home. They should stay with you overnight as well.  If you don't have someone to stay with you, and you aren't safe to go home alone, we may keep you overnight. Insurance often won't pay for this.  After surgery  If it's hard to control your pain or you need more pain medicine, please call your surgeon's office.  Questions?   If you have any questions for your care team, list them here: _________________________________________________________________________________________________________________________________________________________________________ ____________________________________ ____________________________________ ____________________________________  For informational purposes only. Not to replace the advice of your health care provider. Copyright   2003, 2019 [facility]. All rights reserved. Clinically reviewed by Lori Cuevas MD. [employer] 108580 - REV 12/22.     [Follow-Up] : a follow-up visit [FreeTextEntry2] : OAD post Flu

## 2024-01-25 NOTE — PROGRESS NOTES
Preoperative Evaluation  [facility]  [address]  [place] [address]  Phone: [phone]  Primary Provider: No Ref-Primary, Physician  Pre-op Performing Provider: JUSTINE MOON  Jan 25, 2024       Jailyn is a 23 year old, presenting for the following:  Pre-Op Exam        1/25/2024     2:46 PM   Additional Questions   Roomed by An V.         1/25/2024     2:46 PM   Patient Reported Additional Medications   Patient reports taking the following new medications None     Surgical Information  Surgery/Procedure: Robotic Gastric Bypass  Surgery Location: [facility]  Surgeon: Dr. Vonda Camacho  Surgery Date: 02/15/2024  Time of Surgery: 12:22 PM  Where patient plans to recover: At home with family  Fax number for surgical facility: [phone] or [phone]    Assessment & Plan     The proposed surgical procedure is considered INTERMEDIATE risk.    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 Suggest follow up with me in 3-4 months for preventive physical and mental health recheck following her surgery.  - sertraline (ZOLOFT) 50 MG tablet; Take 2 tablets (100 mg) by mouth daily           Risks and Recommendations  The patient has the following additional risks and recommendations for perioperative complications:   - Morbid obesity (BMI >40)    Antiplatelet or Anticoagulation Medication Instructions   - Patient is on no antiplatelet or anticoagulation medications.    Additional Medication Instructions  Patient is to take all scheduled medications on the day of surgery   - SSRIs, SNRIs, TCAs, Antipsychotics: Continue without modification.     Recommendation  APPROVAL GIVEN to proceed with proposed procedure, without further diagnostic evaluation.          Subjective       HPI related to upcoming procedure: Patient  has a BMI of 48.28. Will undergo weight loss surgery.        1/25/2024     2:42 PM   Preop Questions   1. Have you ever had a heart attack or stroke? No   2. Have you ever had surgery on your heart or blood vessels, such as a stent placement, a coronary artery bypass, or surgery on an artery in your head, neck, heart, or legs? No   3. Do you have chest pain with activity? No   4. Do you have a history of  heart failure? No   5. Do you currently have a cold, bronchitis or symptoms of other infection? No   6. Do you have a cough, shortness of breath, or wheezing? No   7. Do you or anyone in your family have previous history of blood clots? No   8. Do you or does anyone in your family have a serious bleeding problem such as prolonged bleeding following surgeries or cuts? No   9. Have you ever had problems with anemia or been told to take iron pills? No   10. Have you had any abnormal blood loss such as black, tarry or bloody stools, or abnormal vaginal bleeding? No   11. Have you ever had a blood transfusion? No   12. Are you willing to have a blood transfusion if it is medically needed before, during, or after your surgery? Yes   13. Have you or any of your relatives ever had problems with anesthesia? No   14. Do you have sleep apnea, excessive snoring or daytime drowsiness? No   15. Do you have any artifical heart valves or other implanted medical devices like a pacemaker, defibrillator, or continuous glucose monitor? No   16. Do you have artificial joints? No   17. Are you allergic to latex? No   18. Is there any chance that you may be pregnant? No       Health Care Directive  Patient does not have a Health Care Directive or Living Will: Discussed advance care planning with patient; however, patient declined at this time.    Preoperative Review of    reviewed - no record of controlled substances prescribed.          Patient Active Problem List    Diagnosis Date Noted    Bipolar 2 disorder, major depressive  "episode (H) 11/20/2014     Priority: Medium    Depression 11/18/2014     Priority: Medium     Hospitalized at [facility] 11/18/2914        No past medical history on file.  No past surgical history on file.  Current Outpatient Medications   Medication Sig Dispense Refill    sertraline (ZOLOFT) 50 MG tablet Take 2 tablets (100 mg) by mouth daily 180 tablet 1       No Known Allergies     Social History     Tobacco Use    Smoking status: Never    Smokeless tobacco: Never   Substance Use Topics    Alcohol use: Yes       History   Drug Use Unknown         Review of Systems    Review of Systems  Constitutional, HEENT, cardiovascular, pulmonary, GI, , musculoskeletal, neuro, skin, endocrine and psych systems are negative, except as otherwise noted.  Objective    /85   Pulse 91   Temp 98.2  F (36.8  C) (Oral)   Ht 1.642 m (5' 4.65\")   Wt 130.2 kg (287 lb)   LMP 12/25/2023   SpO2 99%   BMI 48.28 kg/m     Estimated body mass index is 48.28 kg/m  as calculated from the following:    Height as of this encounter: 1.642 m (5' 4.65\").    Weight as of this encounter: 130.2 kg (287 lb).  Physical Exam  GENERAL: alert and no distress  EYES: Eyes grossly normal to inspection, PERRL and conjunctivae and sclerae normal  HENT: ear canals and TM's normal, nose and mouth without ulcers or lesions  NECK: no adenopathy, no asymmetry, masses, or scars  RESP: lungs clear to auscultation - no rales, rhonchi or wheezes  CV: regular rate and rhythm, normal S1 S2, no S3 or S4, no murmur, click or rub, no peripheral edema  ABDOMEN: soft, nontender, no hepatosplenomegaly, no masses and bowel sounds normal  MS: no gross musculoskeletal defects noted, no edema  SKIN: no suspicious lesions or rashes  NEURO: Normal strength and tone, mentation intact and speech normal  PSYCH: mentation appears normal, affect normal/bright  LYMPH: no cervical, supraclavicular, axillary, or inguinal adenopathy    No results for input(s): \"HGB\", \"PLT\", \"INR\", " "\"NA\", \"POTASSIUM\", \"CR\", \"A1C\" in the last 73735 hours.     Diagnostics  No labs were ordered during this visit.   No EKG required, no history of coronary heart disease, significant arrhythmia, peripheral arterial disease or other structural heart disease.    Revised Cardiac Risk Index (RCRI)  The patient has the following serious cardiovascular risks for perioperative complications:   - No serious cardiac risks = 0 points     RCRI Interpretation: 0 points: Class I (very low risk - 0.4% complication rate)         Signed Electronically by: Charlette Mckeon PA-C  Copy of this evaluation report is provided to requesting physician.         Answers submitted by the patient for this visit:  Patient Health Questionnaire (Submitted on 1/25/2024)  If you checked off any problems, how difficult have these problems made it for you to do your work, take care of things at home, or get along with other people?: Not difficult at all  PHQ9 TOTAL SCORE: 0  VALERY-7 (Submitted on 1/25/2024)  VALERY 7 TOTAL SCORE: 0    "

## 2024-03-11 ENCOUNTER — MEDICAL CORRESPONDENCE (OUTPATIENT)
Dept: HEALTH INFORMATION MANAGEMENT | Facility: CLINIC | Age: 24
End: 2024-03-11
Payer: COMMERCIAL

## 2024-09-27 ENCOUNTER — TRANSFERRED RECORDS (OUTPATIENT)
Dept: MULTI SPECIALTY CLINIC | Facility: CLINIC | Age: 24
End: 2024-09-27

## 2024-09-27 LAB
C TRACH DNA SPEC QL PROBE+SIG AMP: NEGATIVE
SPECIMEN DESCRIPTION: NORMAL

## 2024-11-21 ENCOUNTER — TELEPHONE (OUTPATIENT)
Dept: FAMILY MEDICINE | Facility: CLINIC | Age: 24
End: 2024-11-21
Payer: COMMERCIAL

## 2025-08-14 ENCOUNTER — OFFICE VISIT (OUTPATIENT)
Dept: FAMILY MEDICINE | Facility: CLINIC | Age: 25
End: 2025-08-14
Payer: COMMERCIAL

## 2025-08-14 VITALS
HEART RATE: 85 BPM | RESPIRATION RATE: 12 BRPM | TEMPERATURE: 98.3 F | SYSTOLIC BLOOD PRESSURE: 103 MMHG | HEIGHT: 65 IN | WEIGHT: 149.4 LBS | DIASTOLIC BLOOD PRESSURE: 69 MMHG | BODY MASS INDEX: 24.89 KG/M2 | OXYGEN SATURATION: 100 %

## 2025-08-14 DIAGNOSIS — M25.641 STIFFNESS OF FINGER JOINT, RIGHT: Primary | ICD-10-CM

## 2025-08-14 DIAGNOSIS — L65.9 HAIR LOSS: ICD-10-CM

## 2025-08-14 DIAGNOSIS — F32.A DEPRESSION, UNSPECIFIED DEPRESSION TYPE: ICD-10-CM

## 2025-08-14 DIAGNOSIS — F41.1 GENERALIZED ANXIETY DISORDER: ICD-10-CM

## 2025-08-14 DIAGNOSIS — L98.7 EXCESS SKIN: ICD-10-CM

## 2025-08-14 PROBLEM — R87.612 LOW GRADE SQUAMOUS INTRAEPITHELIAL LESION ON CYTOLOGIC SMEAR OF CERVIX (LGSIL): Status: ACTIVE | Noted: 2023-10-03

## 2025-08-14 PROBLEM — Z98.84 S/P GASTRIC BYPASS: Status: ACTIVE | Noted: 2024-02-15

## 2025-08-14 LAB
CRP SERPL-MCNC: <3 MG/L
ERYTHROCYTE [SEDIMENTATION RATE] IN BLOOD BY WESTERGREN METHOD: 7 MM/HR (ref 0–20)
RHEUMATOID FACT SERPL-ACNC: <10 IU/ML
T4 FREE SERPL-MCNC: 1.17 NG/DL (ref 0.9–1.7)
TSH SERPL DL<=0.005 MIU/L-ACNC: 3.48 UIU/ML (ref 0.3–4.2)
URATE SERPL-MCNC: 4.3 MG/DL (ref 2.4–5.7)

## 2025-08-14 RX ORDER — PHENTERMINE HYDROCHLORIDE 37.5 MG/1
TABLET ORAL
COMMUNITY
Start: 2025-08-13

## 2025-08-28 ENCOUNTER — PATIENT OUTREACH (OUTPATIENT)
Dept: CARE COORDINATION | Facility: CLINIC | Age: 25
End: 2025-08-28
Payer: COMMERCIAL